# Patient Record
Sex: FEMALE | Race: WHITE | NOT HISPANIC OR LATINO | Employment: FULL TIME | URBAN - METROPOLITAN AREA
[De-identification: names, ages, dates, MRNs, and addresses within clinical notes are randomized per-mention and may not be internally consistent; named-entity substitution may affect disease eponyms.]

---

## 2020-01-14 ENCOUNTER — OFFICE VISIT (OUTPATIENT)
Dept: FAMILY MEDICINE | Facility: CLINIC | Age: 50
End: 2020-01-14
Attending: FAMILY MEDICINE
Payer: COMMERCIAL

## 2020-01-14 ENCOUNTER — LAB VISIT (OUTPATIENT)
Dept: LAB | Facility: HOSPITAL | Age: 50
End: 2020-01-14
Attending: FAMILY MEDICINE
Payer: COMMERCIAL

## 2020-01-14 VITALS
BODY MASS INDEX: 28.04 KG/M2 | SYSTOLIC BLOOD PRESSURE: 112 MMHG | HEIGHT: 68 IN | WEIGHT: 185 LBS | HEART RATE: 80 BPM | OXYGEN SATURATION: 98 % | DIASTOLIC BLOOD PRESSURE: 76 MMHG

## 2020-01-14 DIAGNOSIS — J34.9 SINUS TROUBLE: ICD-10-CM

## 2020-01-14 DIAGNOSIS — Z00.00 ANNUAL PHYSICAL EXAM: Primary | ICD-10-CM

## 2020-01-14 DIAGNOSIS — Z12.11 SCREEN FOR COLON CANCER: ICD-10-CM

## 2020-01-14 DIAGNOSIS — Z12.31 ENCOUNTER FOR SCREENING MAMMOGRAM FOR BREAST CANCER: ICD-10-CM

## 2020-01-14 DIAGNOSIS — Z00.00 ANNUAL PHYSICAL EXAM: ICD-10-CM

## 2020-01-14 LAB
ALBUMIN SERPL BCP-MCNC: 3.8 G/DL (ref 3.5–5.2)
ALP SERPL-CCNC: 94 U/L (ref 55–135)
ALT SERPL W/O P-5'-P-CCNC: 29 U/L (ref 10–44)
ANION GAP SERPL CALC-SCNC: 8 MMOL/L (ref 8–16)
AST SERPL-CCNC: 19 U/L (ref 10–40)
BASOPHILS # BLD AUTO: 0.03 K/UL (ref 0–0.2)
BASOPHILS NFR BLD: 0.3 % (ref 0–1.9)
BILIRUB SERPL-MCNC: 0.3 MG/DL (ref 0.1–1)
BILIRUB UR QL STRIP: NEGATIVE
BUN SERPL-MCNC: 11 MG/DL (ref 6–20)
CALCIUM SERPL-MCNC: 9.4 MG/DL (ref 8.7–10.5)
CHLORIDE SERPL-SCNC: 106 MMOL/L (ref 95–110)
CHOLEST SERPL-MCNC: 206 MG/DL (ref 120–199)
CHOLEST/HDLC SERPL: 4.5 {RATIO} (ref 2–5)
CLARITY UR REFRACT.AUTO: CLEAR
CO2 SERPL-SCNC: 25 MMOL/L (ref 23–29)
COLOR UR AUTO: NORMAL
CREAT SERPL-MCNC: 0.8 MG/DL (ref 0.5–1.4)
DIFFERENTIAL METHOD: ABNORMAL
EOSINOPHIL # BLD AUTO: 0.2 K/UL (ref 0–0.5)
EOSINOPHIL NFR BLD: 1.9 % (ref 0–8)
ERYTHROCYTE [DISTWIDTH] IN BLOOD BY AUTOMATED COUNT: 13.3 % (ref 11.5–14.5)
EST. GFR  (AFRICAN AMERICAN): >60 ML/MIN/1.73 M^2
EST. GFR  (NON AFRICAN AMERICAN): >60 ML/MIN/1.73 M^2
GLUCOSE SERPL-MCNC: 101 MG/DL (ref 70–110)
GLUCOSE UR QL STRIP: NEGATIVE
HCT VFR BLD AUTO: 44.3 % (ref 37–48.5)
HDLC SERPL-MCNC: 46 MG/DL (ref 40–75)
HDLC SERPL: 22.3 % (ref 20–50)
HGB BLD-MCNC: 14.2 G/DL (ref 12–16)
HGB UR QL STRIP: NEGATIVE
IMM GRANULOCYTES # BLD AUTO: 0.07 K/UL (ref 0–0.04)
IMM GRANULOCYTES NFR BLD AUTO: 0.6 % (ref 0–0.5)
KETONES UR QL STRIP: NEGATIVE
LDLC SERPL CALC-MCNC: 136.4 MG/DL (ref 63–159)
LEUKOCYTE ESTERASE UR QL STRIP: NEGATIVE
LYMPHOCYTES # BLD AUTO: 2.9 K/UL (ref 1–4.8)
LYMPHOCYTES NFR BLD: 26.4 % (ref 18–48)
MCH RBC QN AUTO: 30.4 PG (ref 27–31)
MCHC RBC AUTO-ENTMCNC: 32.1 G/DL (ref 32–36)
MCV RBC AUTO: 95 FL (ref 82–98)
MONOCYTES # BLD AUTO: 1 K/UL (ref 0.3–1)
MONOCYTES NFR BLD: 9.2 % (ref 4–15)
NEUTROPHILS # BLD AUTO: 6.7 K/UL (ref 1.8–7.7)
NEUTROPHILS NFR BLD: 61.6 % (ref 38–73)
NITRITE UR QL STRIP: NEGATIVE
NONHDLC SERPL-MCNC: 160 MG/DL
NRBC BLD-RTO: 0 /100 WBC
PH UR STRIP: 5 [PH] (ref 5–8)
PLATELET # BLD AUTO: 365 K/UL (ref 150–350)
PMV BLD AUTO: 9.9 FL (ref 9.2–12.9)
POTASSIUM SERPL-SCNC: 4.1 MMOL/L (ref 3.5–5.1)
PROT SERPL-MCNC: 7.2 G/DL (ref 6–8.4)
PROT UR QL STRIP: NEGATIVE
RBC # BLD AUTO: 4.67 M/UL (ref 4–5.4)
SODIUM SERPL-SCNC: 139 MMOL/L (ref 136–145)
SP GR UR STRIP: 1 (ref 1–1.03)
TRIGL SERPL-MCNC: 118 MG/DL (ref 30–150)
TSH SERPL DL<=0.005 MIU/L-ACNC: 0.92 UIU/ML (ref 0.4–4)
URN SPEC COLLECT METH UR: NORMAL
WBC # BLD AUTO: 10.93 K/UL (ref 3.9–12.7)

## 2020-01-14 PROCEDURE — 85025 COMPLETE CBC W/AUTO DIFF WBC: CPT

## 2020-01-14 PROCEDURE — 99999 PR PBB SHADOW E&M-NEW PATIENT-LVL IV: CPT | Mod: PBBFAC,,, | Performed by: FAMILY MEDICINE

## 2020-01-14 PROCEDURE — 80061 LIPID PANEL: CPT

## 2020-01-14 PROCEDURE — 99386 PR PREVENTIVE VISIT,NEW,40-64: ICD-10-PCS | Mod: 25,S$GLB,, | Performed by: FAMILY MEDICINE

## 2020-01-14 PROCEDURE — 80053 COMPREHEN METABOLIC PANEL: CPT

## 2020-01-14 PROCEDURE — 90732 PNEUMOCOCCAL POLYSACCHARIDE VACCINE 23-VALENT =>2YO SQ IM: ICD-10-PCS | Mod: S$GLB,,, | Performed by: FAMILY MEDICINE

## 2020-01-14 PROCEDURE — 90471 PNEUMOCOCCAL POLYSACCHARIDE VACCINE 23-VALENT =>2YO SQ IM: ICD-10-PCS | Mod: S$GLB,,, | Performed by: FAMILY MEDICINE

## 2020-01-14 PROCEDURE — 81003 URINALYSIS AUTO W/O SCOPE: CPT

## 2020-01-14 PROCEDURE — 99386 PREV VISIT NEW AGE 40-64: CPT | Mod: 25,S$GLB,, | Performed by: FAMILY MEDICINE

## 2020-01-14 PROCEDURE — 84443 ASSAY THYROID STIM HORMONE: CPT

## 2020-01-14 PROCEDURE — 99999 PR PBB SHADOW E&M-NEW PATIENT-LVL IV: ICD-10-PCS | Mod: PBBFAC,,, | Performed by: FAMILY MEDICINE

## 2020-01-14 PROCEDURE — 36415 COLL VENOUS BLD VENIPUNCTURE: CPT | Mod: PO

## 2020-01-14 PROCEDURE — 90732 PPSV23 VACC 2 YRS+ SUBQ/IM: CPT | Mod: S$GLB,,, | Performed by: FAMILY MEDICINE

## 2020-01-14 PROCEDURE — 90471 IMMUNIZATION ADMIN: CPT | Mod: S$GLB,,, | Performed by: FAMILY MEDICINE

## 2020-01-14 RX ORDER — LEVOCETIRIZINE DIHYDROCHLORIDE 5 MG/1
5 TABLET, FILM COATED ORAL NIGHTLY
Qty: 30 TABLET | Refills: 3 | Status: SHIPPED | OUTPATIENT
Start: 2020-01-14 | End: 2020-02-13

## 2020-01-14 NOTE — PROGRESS NOTES
Patient was given Pneumococcal 23 IM in Left Deltoid as per orders from MD. Aseptic tech used and pt tolerated well. Pt was monitored for 15 mins with no reaction noted.

## 2020-01-16 ENCOUNTER — PATIENT OUTREACH (OUTPATIENT)
Dept: ADMINISTRATIVE | Facility: HOSPITAL | Age: 50
End: 2020-01-16

## 2020-01-16 DIAGNOSIS — Z12.11 SCREENING FOR COLORECTAL CANCER: Primary | ICD-10-CM

## 2020-01-16 DIAGNOSIS — Z12.12 SCREENING FOR COLORECTAL CANCER: Primary | ICD-10-CM

## 2020-01-16 NOTE — PROGRESS NOTES
Subjective:       Patient ID: Mile Sharma is a 49 y.o. female.    Chief Complaint: Annual Exam and Sinus Problem    HPI   Pt is here for annual exam pt is well no sob/cp no change in bowel habits no brbpr   pt c/o weeks of intermittent sinus trouble not taking meds on a schedule used otc prn   Had urgent care visits she now has right pressure and right facial puffiness She would like to see ENT for their work up. No fever/chills   No change abnl vaginal bleeding no discharge itching or burning, pap with gyn   Review of Systems   Constitutional: Negative for activity change, chills, diaphoresis, fatigue and fever.   HENT: Negative for congestion, ear discharge, ear pain, hearing loss, postnasal drip, rhinorrhea, sinus pressure, sneezing, sore throat, trouble swallowing and voice change.    Eyes: Negative for photophobia, discharge, redness, itching and visual disturbance.   Respiratory: Negative for cough, chest tightness, shortness of breath and wheezing.    Cardiovascular: Negative for chest pain, palpitations and leg swelling.   Gastrointestinal: Negative for abdominal pain, anal bleeding, blood in stool, constipation, diarrhea, nausea, rectal pain and vomiting.   Genitourinary: Negative for dyspareunia, dysuria, flank pain, frequency, hematuria, menstrual problem, pelvic pain, urgency, vaginal bleeding and vaginal discharge.   Musculoskeletal: Negative for arthralgias, back pain, joint swelling and neck pain.   Skin: Negative for color change and rash.   Neurological: Negative for dizziness, speech difficulty, weakness, light-headedness, numbness and headaches.   Hematological: Does not bruise/bleed easily.   Psychiatric/Behavioral: Negative for agitation, confusion, decreased concentration, sleep disturbance and suicidal ideas. The patient is not nervous/anxious.        Objective:      Physical Exam   Constitutional: She appears well-developed and well-nourished.   HENT:   Head: Normocephalic and  "atraumatic.   Right Ear: External ear normal.   Left Ear: External ear normal.   Mouth/Throat: Oropharynx is clear and moist. No oropharyngeal exudate.   Tm pearly bilaterally nares patent bilaterally with thick clear mucus mild right maxillary tenderness with suborbital puffiness   Eyes: Pupils are equal, round, and reactive to light. Conjunctivae and EOM are normal. Right eye exhibits no discharge. Left eye exhibits no discharge.   Neck: Normal range of motion. Neck supple. No thyromegaly present.   Cardiovascular: Normal rate and regular rhythm. Exam reveals no gallop.   Pulmonary/Chest: Effort normal and breath sounds normal. She has no wheezes. She has no rales.   Abdominal: Soft. Bowel sounds are normal. She exhibits no distension. There is no tenderness. There is no rebound and no guarding.   Genitourinary:   Genitourinary Comments: declined   Musculoskeletal: Normal range of motion. She exhibits no edema or tenderness.   Lymphadenopathy:     She has no cervical adenopathy.   Neurological: She is alert. No cranial nerve deficit. She exhibits normal muscle tone. Coordination normal.   Skin: Skin is warm and dry. No rash noted. No erythema.   Psychiatric: She has a normal mood and affect. Her behavior is normal. Judgment and thought content normal.       Assessment:       1. Annual physical exam    2. Sinus trouble    3. Encounter for screening mammogram for breast cancer        Plan:     orders cbc cmp lipid tsh urine sinus x-ray  Cont meds  Start xyzal   Low fat diet  Graded exercise  rtc annually and prn    Health maintenance  Pap with gyn  Breast exam with pap  Lipid ordred  Mammogram ordered  Flu discussed  Tetanus q 10 years        "This note will not be shared with the patient."   "

## 2020-01-20 ENCOUNTER — HOSPITAL ENCOUNTER (OUTPATIENT)
Dept: RADIOLOGY | Facility: HOSPITAL | Age: 50
Discharge: HOME | End: 2020-01-20
Attending: FAMILY MEDICINE
Payer: COMMERCIAL

## 2020-01-20 DIAGNOSIS — J34.9 SINUS TROUBLE: ICD-10-CM

## 2020-01-20 DIAGNOSIS — Z00.00 ANNUAL PHYSICAL EXAM: ICD-10-CM

## 2020-01-20 DIAGNOSIS — Z12.31 ENCOUNTER FOR SCREENING MAMMOGRAM FOR BREAST CANCER: ICD-10-CM

## 2020-01-20 PROCEDURE — 77067 MAMMO DIGITAL SCREENING BILAT WITH TOMOSYNTHESIS_CAD: ICD-10-PCS | Mod: 26,,, | Performed by: RADIOLOGY

## 2020-01-20 PROCEDURE — 77063 BREAST TOMOSYNTHESIS BI: CPT | Mod: 26,,, | Performed by: RADIOLOGY

## 2020-01-20 PROCEDURE — 77063 MAMMO DIGITAL SCREENING BILAT WITH TOMOSYNTHESIS_CAD: ICD-10-PCS | Mod: 26,,, | Performed by: RADIOLOGY

## 2020-01-20 PROCEDURE — 70220 X-RAY EXAM OF SINUSES: CPT | Mod: TC,PN

## 2020-01-20 PROCEDURE — 77067 SCR MAMMO BI INCL CAD: CPT | Mod: 26,,, | Performed by: RADIOLOGY

## 2020-01-20 PROCEDURE — 70220 X-RAY EXAM OF SINUSES: CPT | Mod: 26,,, | Performed by: RADIOLOGY

## 2020-01-20 PROCEDURE — 77067 SCR MAMMO BI INCL CAD: CPT | Mod: TC,PN

## 2020-01-20 PROCEDURE — 70220 XR SINUSES MIN 3 VIEWS: ICD-10-PCS | Mod: 26,,, | Performed by: RADIOLOGY

## 2020-01-22 ENCOUNTER — OFFICE VISIT (OUTPATIENT)
Dept: OTOLARYNGOLOGY | Facility: CLINIC | Age: 50
End: 2020-01-22
Attending: FAMILY MEDICINE
Payer: COMMERCIAL

## 2020-01-22 VITALS
BODY MASS INDEX: 27.76 KG/M2 | HEIGHT: 68 IN | WEIGHT: 183.19 LBS | DIASTOLIC BLOOD PRESSURE: 78 MMHG | HEART RATE: 78 BPM | TEMPERATURE: 98 F | SYSTOLIC BLOOD PRESSURE: 113 MMHG

## 2020-01-22 DIAGNOSIS — F41.9 ANXIETY: ICD-10-CM

## 2020-01-22 DIAGNOSIS — R19.8 CLENCHING OF TEETH: ICD-10-CM

## 2020-01-22 DIAGNOSIS — R09.81 NASAL CONGESTION: ICD-10-CM

## 2020-01-22 DIAGNOSIS — J34.89 PURULENT NASAL DISCHARGE: ICD-10-CM

## 2020-01-22 DIAGNOSIS — R09.82 POSTNASAL DRIP: ICD-10-CM

## 2020-01-22 DIAGNOSIS — R43.1 UNUSUAL SMELL IN NOSE: ICD-10-CM

## 2020-01-22 DIAGNOSIS — R44.8 FACIAL PRESSURE: ICD-10-CM

## 2020-01-22 PROCEDURE — 31231 NASAL/SINUS ENDOSCOPY: ICD-10-PCS | Mod: S$GLB,,, | Performed by: OTOLARYNGOLOGY

## 2020-01-22 PROCEDURE — 31231 NASAL ENDOSCOPY DX: CPT | Mod: S$GLB,,, | Performed by: OTOLARYNGOLOGY

## 2020-01-22 PROCEDURE — 99204 OFFICE O/P NEW MOD 45 MIN: CPT | Mod: 25,S$GLB,, | Performed by: OTOLARYNGOLOGY

## 2020-01-22 PROCEDURE — 3008F BODY MASS INDEX DOCD: CPT | Mod: CPTII,S$GLB,, | Performed by: OTOLARYNGOLOGY

## 2020-01-22 PROCEDURE — 96372 PR INJECTION,THERAP/PROPH/DIAG2ST, IM OR SUBCUT: ICD-10-PCS | Mod: 59,S$GLB,, | Performed by: OTOLARYNGOLOGY

## 2020-01-22 PROCEDURE — 96372 THER/PROPH/DIAG INJ SC/IM: CPT | Mod: 59,S$GLB,, | Performed by: OTOLARYNGOLOGY

## 2020-01-22 PROCEDURE — 99204 PR OFFICE/OUTPT VISIT, NEW, LEVL IV, 45-59 MIN: ICD-10-PCS | Mod: 25,S$GLB,, | Performed by: OTOLARYNGOLOGY

## 2020-01-22 PROCEDURE — 3008F PR BODY MASS INDEX (BMI) DOCUMENTED: ICD-10-PCS | Mod: CPTII,S$GLB,, | Performed by: OTOLARYNGOLOGY

## 2020-01-22 PROCEDURE — 87070 CULTURE OTHR SPECIMN AEROBIC: CPT

## 2020-01-22 RX ORDER — METHYLPREDNISOLONE 4 MG/1
TABLET ORAL
Qty: 1 PACKAGE | Refills: 0 | Status: SHIPPED | OUTPATIENT
Start: 2020-01-22 | End: 2020-02-18

## 2020-01-22 RX ORDER — AZELASTINE 1 MG/ML
SPRAY, METERED NASAL
Qty: 30 ML | Refills: 1 | Status: SHIPPED | OUTPATIENT
Start: 2020-01-22

## 2020-01-22 RX ORDER — AMOXICILLIN AND CLAVULANATE POTASSIUM 875; 125 MG/1; MG/1
1 TABLET, FILM COATED ORAL 2 TIMES DAILY
Qty: 20 TABLET | Refills: 0 | Status: SHIPPED | OUTPATIENT
Start: 2020-01-22 | End: 2020-02-01

## 2020-01-22 RX ORDER — BETAMETHASONE SODIUM PHOSPHATE AND BETAMETHASONE ACETATE 3; 3 MG/ML; MG/ML
12 INJECTION, SUSPENSION INTRA-ARTICULAR; INTRALESIONAL; INTRAMUSCULAR; SOFT TISSUE
Status: COMPLETED | OUTPATIENT
Start: 2020-01-22 | End: 2020-01-22

## 2020-01-22 RX ADMIN — BETAMETHASONE SODIUM PHOSPHATE AND BETAMETHASONE ACETATE 12 MG: 3; 3 INJECTION, SUSPENSION INTRA-ARTICULAR; INTRALESIONAL; INTRAMUSCULAR; SOFT TISSUE at 02:01

## 2020-01-22 NOTE — PROGRESS NOTES
Subjective:       Patient ID: Mile Sharma is a 49 y.o. female.    Chief Complaint: Sinus Problem    She is a new patient for me here today complaining of sinus problems since June 2019.  She has had pressure behind the eyes and in her cheeks and more recently in her ears.  There has also been a foul odor primarily in the right nostril on and off and occasionally bilaterally.  She has been on 3 rounds of antibiotics which helped but symptoms recur.  Also has been taking fluticasone nasal spray.  Current symptoms include nasal congestion on and off, thick nasal secretions with postnasal drip, cough, no wheezing or chest congestion.  Also reports history of severe anxiety and experiences clenching.  Has been taking 2-6 Aleve per day most days for the past month.  Smokes 1 pack per day.  States otherwise in good health.        Review of Systems   Ears: Positive for hearing loss, ear pain, ear pressure and ringing in ear.  Negative for ear discharge, head trauma, taken gentramycin/streptomycin and family history of hearing loss.    Nose:  Positive for postnasal drip. Negative for nasal obstruction and nasal or sinus surgery.    Mouth/Throat: Negative for pain swallowing, impaired swallowing, throat mass and neck mass.   Constitutional: Negative for recent unexplained weight loss and fever.    Eyes:  Negative for history of glaucoma.   Cardiovascular:  Negative for chest pain, palpitations and history of high blood pressure.   Respiratory:  Positive for recent cough. Negative for asthma, emphysema, history of tuberculosis and shortness of breath.    Gastrointestinal:  Positive for acid reflux. Negative for history of stomach ulcers or pain and blood in stool.   Other:  Positive for anxiety. Negative for kidney problem, bladder problem, arthritis, disturbances in coordination, slurred, confusion and anemia.           Objective:        Vitals:    01/22/20 1344   BP: 113/78   Pulse: 78   Temp: 98.2 °F (36.8 °C)      Body mass index is 27.86 kg/m².  Physical Exam   Constitutional: She is oriented to person, place, and time. She appears well-developed and well-nourished. No distress.   HENT:   Head: Normocephalic and atraumatic.   Right Ear: Tympanic membrane and external ear normal.   Left Ear: Tympanic membrane, external ear and ear canal normal.   Nose: Septal deviation present. No mucosal edema, rhinorrhea or nasal deformity. No epistaxis.   Mouth/Throat: Oropharynx is clear and moist and mucous membranes are normal. No oral lesions. No uvula swelling. No oropharyngeal exudate, posterior oropharyngeal edema or posterior oropharyngeal erythema.   Increased cerumen cleared with curette AD.  Otherwise canals and TMs within normal limits.   Neck: Phonation normal. Neck supple. No tracheal deviation present.   Pulmonary/Chest: Effort normal and breath sounds normal.   Lymphadenopathy:     She has no cervical adenopathy.   Neurological: She is alert and oriented to person, place, and time.   Skin: Skin is warm and dry.   Psychiatric: Her behavior is normal. Her speech is not slurred.       Tests / Results:    Recent plain films of sinuses report reviewed.        Assessment:       1. Facial pressure    2. Nasal congestion    3. Postnasal drip    4. Unusual smell in nose    5. Purulent nasal discharge    6. Clenching of teeth    7. Anxiety        Plan:        Reviewed above and office endoscopy and she would like to proceed.  See procedure note.    Right middle meatal endoscopic nasal culture submitted for culture and sensitivity.  Begin empiric antibiotics with Augmentin 875 mg twice daily pending response and culture.  Steroid course discussed and pros, cons, risks.  Given her history of anxiety would like to proceed with Celestone IM today and then begin Medrol Dosepak in a.m.  Change to azelastine nasal spray and reviewed use of 2 sprays in each nostril twice daily.  Use over-the-counter Afrin nasal spray prior the 1st 3 days  only.  Follow-up in 2 weeks.

## 2020-01-22 NOTE — PATIENT INSTRUCTIONS
Take antibiotic as prescribed with food.  Take steroid dose pack with food starting tomorrow morning.    Take azelastine nasal spray 2 sprays in each nostril twice a day.    Use OTC Afrin nasal spray prior the first 3 days only, then discontinue Afrin and continue the azelastine spray alone.      Follow up in 2 weeks.

## 2020-01-22 NOTE — PROCEDURES
Nasal/sinus endoscopy  Date/Time: 1/22/2020 1:40 PM  Performed by: Ewa Rich MD  Authorized by: Ewa Rich MD     Consent Done?:  Yes (Verbal)  Anesthesia:     Local anesthetic:  Lidocaine 2% and Cheng-Synephrine 1/2%    Patient tolerance:  Patient tolerated the procedure well with no immediate complications  Nose:     Procedure Performed:  Nasal Endoscopy  External:      No external nasal deformity  Intranasal:      Mucosa no polyps     Mucosa ulcers not present     No mucosa lesions present     Septum gross deformity  Nasopharynx:      No mucosa lesions     Adenoids not present     Posterior choanae patent     Nasal endoscopy reveals spur right mid septum adjacent to right middle meatus with confluence of edema despite Cheng-Synephrine spray and purulent secretions.  Culture obtained endoscopically.  Mild whitish secretions left nasal floor.  Tolerated well.

## 2020-01-22 NOTE — LETTER
January 22, 2020      Avelina Regalado MD  [address]  [address]  [facility] [address]           [facility] [place] [address]  [address]  [facility] [address]  Phone: [phone]  Fax: [phone]          Patient: Mile Sharma   MR Number: [id number]   YOB: 1970   Date of Visit: 1/22/2020       Dear Dr. Avelina Regalado:    Thank you for referring Mile Sharma to me for evaluation. Attached you will find relevant portions of my assessment and plan of care.    If you have questions, please do not hesitate to call me. I look forward to following Mile Sharma along with you.    Sincerely,    Ewa Rich MD    Enclosure  CC:  No Recipients    If you would like to receive this communication electronically, please contact [email or web address] or [phone] to request more information on [employer] access.    For providers and/or their staff who would like to refer a patient to [facility], please contact us through our one-stop-shop provider referral line, [facility] [place], at [phone].    If you feel you have received this communication in error or would no longer like to receive these types of communications, please e-mail [email or web address]

## 2020-01-25 LAB — BACTERIA NPH AEROBE CULT: ABNORMAL

## 2020-01-30 ENCOUNTER — TELEPHONE (OUTPATIENT)
Dept: FAMILY MEDICINE | Facility: CLINIC | Age: 50
End: 2020-01-30

## 2020-01-30 NOTE — TELEPHONE ENCOUNTER
----- Message from Avelina Regalado MD sent at 1/27/2020  5:02 AM CST -----  Pt saw ent for this on the 22nd please find out if pt would like a copy of this report as she is not on the portal

## 2020-02-04 ENCOUNTER — PATIENT OUTREACH (OUTPATIENT)
Dept: ADMINISTRATIVE | Facility: HOSPITAL | Age: 50
End: 2020-02-04

## 2020-02-18 ENCOUNTER — OFFICE VISIT (OUTPATIENT)
Dept: FAMILY MEDICINE | Facility: CLINIC | Age: 50
End: 2020-02-18
Attending: FAMILY MEDICINE
Payer: COMMERCIAL

## 2020-02-18 VITALS
OXYGEN SATURATION: 98 % | WEIGHT: 184 LBS | HEIGHT: 68 IN | SYSTOLIC BLOOD PRESSURE: 129 MMHG | HEART RATE: 74 BPM | DIASTOLIC BLOOD PRESSURE: 72 MMHG | BODY MASS INDEX: 27.89 KG/M2

## 2020-02-18 DIAGNOSIS — Z00.00 WELLNESS EXAMINATION: ICD-10-CM

## 2020-02-18 DIAGNOSIS — Z12.11 SCREEN FOR COLON CANCER: ICD-10-CM

## 2020-02-18 DIAGNOSIS — Z01.419 ENCOUNTER FOR GYNECOLOGICAL EXAMINATION WITH PAPANICOLAOU SMEAR OF CERVIX: Primary | ICD-10-CM

## 2020-02-18 PROCEDURE — 88141 PR  CYTOPATH CERV/VAG INTERPRET: ICD-10-PCS | Mod: ,,, | Performed by: PATHOLOGY

## 2020-02-18 PROCEDURE — 99999 PR PBB SHADOW E&M-EST. PATIENT-LVL III: ICD-10-PCS | Mod: PBBFAC,,, | Performed by: FAMILY MEDICINE

## 2020-02-18 PROCEDURE — 99999 PR PBB SHADOW E&M-EST. PATIENT-LVL III: CPT | Mod: PBBFAC,,, | Performed by: FAMILY MEDICINE

## 2020-02-18 PROCEDURE — 99396 PR PREVENTIVE VISIT,EST,40-64: ICD-10-PCS | Mod: S$GLB,,, | Performed by: FAMILY MEDICINE

## 2020-02-18 PROCEDURE — 99396 PREV VISIT EST AGE 40-64: CPT | Mod: S$GLB,,, | Performed by: FAMILY MEDICINE

## 2020-02-18 PROCEDURE — 88141 CYTOPATH C/V INTERPRET: CPT | Mod: ,,, | Performed by: PATHOLOGY

## 2020-02-18 PROCEDURE — 88175 CYTOPATH C/V AUTO FLUID REDO: CPT | Mod: 91 | Performed by: PATHOLOGY

## 2020-02-27 NOTE — PROGRESS NOTES
Subjective:       Patient ID: Mile Sharma is a 49 y.o. female.    Chief Complaint: Gynecologic Exam and Annual Exam    HPI   Pt is here for wwe she is well no abnl vaginal bleeding no discharge itching or burning no dysuria or hematuria no brbpr labs with physical   Review of Systems   Constitutional: Negative for activity change, chills, diaphoresis, fatigue and fever.   HENT: Negative for congestion, ear discharge, ear pain, hearing loss, postnasal drip, rhinorrhea, sinus pressure, sneezing, sore throat, trouble swallowing and voice change.    Eyes: Negative for photophobia, discharge, redness, itching and visual disturbance.   Respiratory: Negative for cough, chest tightness, shortness of breath and wheezing.    Cardiovascular: Negative for chest pain, palpitations and leg swelling.   Gastrointestinal: Negative for abdominal pain, anal bleeding, blood in stool, constipation, diarrhea, nausea, rectal pain and vomiting.   Genitourinary: Negative for dyspareunia, dysuria, flank pain, frequency, hematuria, menstrual problem, pelvic pain, urgency, vaginal bleeding and vaginal discharge.   Musculoskeletal: Negative for arthralgias, back pain, joint swelling and neck pain.   Skin: Negative for color change and rash.   Neurological: Negative for dizziness, speech difficulty, weakness, light-headedness, numbness and headaches.   Hematological: Does not bruise/bleed easily.   Psychiatric/Behavioral: Negative for agitation, confusion, decreased concentration, sleep disturbance and suicidal ideas. The patient is not nervous/anxious.        Objective:      Physical Exam   Constitutional: She appears well-developed and well-nourished.   HENT:   Head: Normocephalic and atraumatic.   Right Ear: External ear normal.   Left Ear: External ear normal.   Nose: Nose normal.   Mouth/Throat: Oropharynx is clear and moist.   Eyes: Pupils are equal, round, and reactive to light. Conjunctivae and EOM are normal. Right eye exhibits  "no discharge. Left eye exhibits no discharge.   Neck: Normal range of motion. Neck supple. No thyromegaly present.   Cardiovascular: Normal rate and regular rhythm. Exam reveals no gallop.   Pulmonary/Chest: Effort normal and breath sounds normal. She has no wheezes. She has no rales.   Abdominal: Soft. Bowel sounds are normal. She exhibits no distension. There is no tenderness. There is no rebound and no guarding.   Genitourinary: Vagina normal and uterus normal. No vaginal discharge found.   Genitourinary Comments: nefg v/v urethra/urethral meatus w/o lesions or prolapse normal hair distribution cervix pink no adnexal tenderness or fullness moveable uterus     Breasts symmetric no masses nipple discharge or overlying skin changes   Musculoskeletal: Normal range of motion. She exhibits no edema or tenderness.   Lymphadenopathy:     She has no cervical adenopathy.   Neurological: She is alert. No cranial nerve deficit. She exhibits normal muscle tone. Coordination normal.   Skin: Skin is warm and dry. No rash noted. No erythema.   Psychiatric: She has a normal mood and affect. Her behavior is normal. Judgment and thought content normal.       Assessment:       1. Encounter for gynecological examination with Papanicolaou smear of cervix    2. Screen for colon cancer    3. Wellness examination        Plan:     orders pap   Cont meds  Low salt diet  Graded exercise  rtc annually and prn    Edwar maintenance  Lipid done  Pap today  Breast exam today  Mammogram discussed  Colonoscopy discussed       "This note will not be shared with the patient."   "

## 2020-03-23 LAB
FINAL PATHOLOGIC DIAGNOSIS: ABNORMAL
Lab: ABNORMAL

## 2020-03-23 PROCEDURE — 87624 HPV HI-RISK TYP POOLED RSLT: CPT

## 2020-03-27 LAB
HPV HR 12 DNA SPEC QL NAA+PROBE: NEGATIVE
HPV16 AG SPEC QL: NEGATIVE
HPV18 DNA SPEC QL NAA+PROBE: NEGATIVE

## 2020-04-03 ENCOUNTER — TELEPHONE (OUTPATIENT)
Dept: FAMILY MEDICINE | Facility: CLINIC | Age: 50
End: 2020-04-03

## 2020-04-03 NOTE — TELEPHONE ENCOUNTER
----- Message from Avelina Regalado MD sent at 4/2/2020 10:32 AM CDT -----  Pap is fineas  hpv testing for high risk is negative

## 2020-04-03 NOTE — TELEPHONE ENCOUNTER
----- Message from Jolly Castelan sent at 4/3/2020 10:14 AM CDT -----  Contact: LILIANA ARVIZU    Type:  Patient Returning Call    Who Called: LILIANA ARVIZU     Who Left Message for Patient:Monica    Does the patient know what this is regarding?yes    Best Call Back Number:[phone]    Additional Information:

## 2021-02-05 DIAGNOSIS — Z12.31 OTHER SCREENING MAMMOGRAM: ICD-10-CM

## 2022-01-21 ENCOUNTER — IMMUNIZATION (OUTPATIENT)
Dept: PRIMARY CARE CLINIC | Facility: CLINIC | Age: 52
End: 2022-01-21
Payer: COMMERCIAL

## 2022-01-21 DIAGNOSIS — Z23 NEED FOR VACCINATION: Primary | ICD-10-CM

## 2022-01-21 PROCEDURE — 0064A COVID-19, MRNA, LNP-S, PF, 100 MCG/0.25 ML DOSE VACCINE (MODERNA BOOSTER): CPT | Mod: CV19,PBBFAC | Performed by: INTERNAL MEDICINE

## 2022-01-21 PROCEDURE — 91306 COVID-19, MRNA, LNP-S, PF, 100 MCG/0.25 ML DOSE VACCINE (MODERNA BOOSTER): CPT | Mod: PBBFAC | Performed by: INTERNAL MEDICINE
